# Patient Record
Sex: FEMALE | Race: WHITE | NOT HISPANIC OR LATINO
[De-identification: names, ages, dates, MRNs, and addresses within clinical notes are randomized per-mention and may not be internally consistent; named-entity substitution may affect disease eponyms.]

---

## 2017-02-06 ENCOUNTER — RECORDS - HEALTHEAST (OUTPATIENT)
Dept: ADMINISTRATIVE | Facility: OTHER | Age: 42
End: 2017-02-06

## 2017-03-17 ENCOUNTER — HOSPITAL ENCOUNTER (OUTPATIENT)
Dept: NEUROLOGY | Facility: CLINIC | Age: 42
Setting detail: THERAPIES SERIES
Discharge: STILL ADMITTED OR ADMINISTRATIVE | End: 2017-03-17
Attending: NURSE PRACTITIONER

## 2017-03-17 DIAGNOSIS — S06.9XAS HEADACHE AS LATE EFFECT OF BRAIN INJURY (H): ICD-10-CM

## 2017-03-17 DIAGNOSIS — R56.9 CONVULSIONS, UNSPECIFIED CONVULSION TYPE (H): ICD-10-CM

## 2017-03-17 DIAGNOSIS — F07.81 POST CONCUSSION SYNDROME: ICD-10-CM

## 2017-03-17 DIAGNOSIS — F06.4 ANXIETY DISORDER DUE TO MEDICAL CONDITION: ICD-10-CM

## 2017-03-17 DIAGNOSIS — G44.309 HEADACHE AS LATE EFFECT OF BRAIN INJURY (H): ICD-10-CM

## 2017-03-17 ASSESSMENT — MIFFLIN-ST. JEOR: SCORE: 1842.44

## 2017-03-21 ENCOUNTER — HOSPITAL ENCOUNTER (OUTPATIENT)
Dept: NEUROLOGY | Facility: CLINIC | Age: 42
Setting detail: THERAPIES SERIES
Discharge: STILL ADMITTED OR ADMINISTRATIVE | End: 2017-03-21
Attending: NURSE PRACTITIONER

## 2017-03-21 DIAGNOSIS — F34.1 PERSISTENT DEPRESSIVE DISORDER: ICD-10-CM

## 2017-03-21 DIAGNOSIS — F43.23 ADJUSTMENT DISORDER WITH MIXED ANXIETY AND DEPRESSED MOOD: ICD-10-CM

## 2017-03-21 DIAGNOSIS — F43.10 POSTTRAUMATIC STRESS DISORDER: ICD-10-CM

## 2017-03-23 ENCOUNTER — HOSPITAL ENCOUNTER (OUTPATIENT)
Dept: PHYSICAL THERAPY | Age: 42
Setting detail: THERAPIES SERIES
Discharge: STILL ADMITTED OR ADMINISTRATIVE | End: 2017-03-23
Attending: NURSE PRACTITIONER

## 2017-03-23 DIAGNOSIS — S06.9XAS HEADACHE AS LATE EFFECT OF BRAIN INJURY (H): ICD-10-CM

## 2017-03-23 DIAGNOSIS — F07.81 POST CONCUSSION SYNDROME: ICD-10-CM

## 2017-03-23 DIAGNOSIS — G44.309 HEADACHE AS LATE EFFECT OF BRAIN INJURY (H): ICD-10-CM

## 2017-03-27 ENCOUNTER — HOSPITAL ENCOUNTER (OUTPATIENT)
Dept: NEUROLOGY | Facility: CLINIC | Age: 42
Discharge: HOME | End: 2017-03-27
Attending: NURSE PRACTITIONER

## 2017-03-27 DIAGNOSIS — S06.9XAS HEADACHE AS LATE EFFECT OF BRAIN INJURY (H): ICD-10-CM

## 2017-03-27 DIAGNOSIS — F07.81 POST CONCUSSION SYNDROME: ICD-10-CM

## 2017-03-27 DIAGNOSIS — G44.309 HEADACHE AS LATE EFFECT OF BRAIN INJURY (H): ICD-10-CM

## 2017-03-28 ENCOUNTER — HOSPITAL ENCOUNTER (OUTPATIENT)
Dept: SPEECH THERAPY | Age: 42
Setting detail: THERAPIES SERIES
Discharge: STILL ADMITTED OR ADMINISTRATIVE | End: 2017-03-28
Attending: NURSE PRACTITIONER

## 2017-03-28 ENCOUNTER — HOSPITAL ENCOUNTER (OUTPATIENT)
Dept: OCCUPATIONAL THERAPY | Age: 42
Setting detail: THERAPIES SERIES
Discharge: STILL ADMITTED OR ADMINISTRATIVE | End: 2017-03-28
Attending: NURSE PRACTITIONER

## 2017-03-28 DIAGNOSIS — F07.81 POST CONCUSSION SYNDROME: ICD-10-CM

## 2017-03-28 DIAGNOSIS — H53.9 VISUAL DISTURBANCE: ICD-10-CM

## 2017-04-03 ENCOUNTER — HOSPITAL ENCOUNTER (OUTPATIENT)
Dept: NEUROLOGY | Facility: CLINIC | Age: 42
Setting detail: THERAPIES SERIES
Discharge: STILL ADMITTED OR ADMINISTRATIVE | End: 2017-04-03
Attending: NURSE PRACTITIONER

## 2017-04-03 ENCOUNTER — HOSPITAL ENCOUNTER (OUTPATIENT)
Dept: RADIOLOGY | Facility: CLINIC | Age: 42
Discharge: HOME | End: 2017-04-03
Attending: NURSE PRACTITIONER
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
## 2017-04-06 ENCOUNTER — HOSPITAL ENCOUNTER (OUTPATIENT)
Dept: SPEECH THERAPY | Age: 42
Setting detail: THERAPIES SERIES
Discharge: STILL ADMITTED OR ADMINISTRATIVE | End: 2017-04-06

## 2017-04-06 ENCOUNTER — HOSPITAL ENCOUNTER (OUTPATIENT)
Dept: OCCUPATIONAL THERAPY | Age: 42
Setting detail: THERAPIES SERIES
Discharge: STILL ADMITTED OR ADMINISTRATIVE | End: 2017-04-06
Attending: OCCUPATIONAL THERAPIST

## 2017-04-06 DIAGNOSIS — R41.841 COGNITIVE COMMUNICATION DEFICIT: ICD-10-CM

## 2017-04-06 DIAGNOSIS — F07.81 POST CONCUSSION SYNDROME: ICD-10-CM

## 2017-04-06 DIAGNOSIS — H53.9 VISUAL DISTURBANCE: ICD-10-CM

## 2017-04-07 ENCOUNTER — HOSPITAL ENCOUNTER (OUTPATIENT)
Dept: NEUROLOGY | Facility: CLINIC | Age: 42
Setting detail: THERAPIES SERIES
Discharge: STILL ADMITTED OR ADMINISTRATIVE | End: 2017-04-07

## 2017-04-07 DIAGNOSIS — F43.10 POSTTRAUMATIC STRESS DISORDER: ICD-10-CM

## 2017-04-07 DIAGNOSIS — F33.1 MAJOR DEPRESSIVE DISORDER, RECURRENT EPISODE, MODERATE (H): ICD-10-CM

## 2017-04-07 DIAGNOSIS — F43.23 ADJUSTMENT DISORDER WITH MIXED ANXIETY AND DEPRESSED MOOD: ICD-10-CM

## 2017-04-11 ENCOUNTER — HOSPITAL ENCOUNTER (OUTPATIENT)
Dept: NEUROLOGY | Facility: CLINIC | Age: 42
Setting detail: THERAPIES SERIES
Discharge: STILL ADMITTED OR ADMINISTRATIVE | End: 2017-04-11
Attending: NURSE PRACTITIONER

## 2017-04-11 DIAGNOSIS — F06.4 ANXIETY DISORDER DUE TO MEDICAL CONDITION: ICD-10-CM
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
## 2017-04-12 ENCOUNTER — HOSPITAL ENCOUNTER (OUTPATIENT)
Dept: NEUROLOGY | Facility: CLINIC | Age: 42
Discharge: HOME | End: 2017-04-12
Attending: NURSE PRACTITIONER

## 2017-04-13 ENCOUNTER — HOSPITAL ENCOUNTER (OUTPATIENT)
Dept: SPEECH THERAPY | Age: 42
Setting detail: THERAPIES SERIES
Discharge: STILL ADMITTED OR ADMINISTRATIVE | End: 2017-04-13

## 2017-04-13 ENCOUNTER — HOSPITAL ENCOUNTER (OUTPATIENT)
Dept: OCCUPATIONAL THERAPY | Age: 42
Setting detail: THERAPIES SERIES
Discharge: STILL ADMITTED OR ADMINISTRATIVE | End: 2017-04-13
Attending: OCCUPATIONAL THERAPIST

## 2017-04-13 DIAGNOSIS — H53.9 VISION DISTURBANCE: ICD-10-CM

## 2017-04-13 DIAGNOSIS — F07.81 POST CONCUSSION SYNDROME: ICD-10-CM

## 2017-04-13 DIAGNOSIS — R41.841 COGNITIVE COMMUNICATION DEFICIT: ICD-10-CM

## 2017-04-21 ENCOUNTER — HOSPITAL ENCOUNTER (OUTPATIENT)
Dept: NEUROLOGY | Facility: CLINIC | Age: 42
Setting detail: THERAPIES SERIES
Discharge: STILL ADMITTED OR ADMINISTRATIVE | End: 2017-04-21

## 2017-04-21 DIAGNOSIS — F33.1 MAJOR DEPRESSIVE DISORDER, RECURRENT EPISODE, MODERATE (H): ICD-10-CM

## 2017-04-21 DIAGNOSIS — F43.23 ADJUSTMENT DISORDER WITH MIXED ANXIETY AND DEPRESSED MOOD: ICD-10-CM

## 2017-04-21 DIAGNOSIS — F43.10 POSTTRAUMATIC STRESS DISORDER: ICD-10-CM

## 2017-04-27 ENCOUNTER — HOSPITAL ENCOUNTER (OUTPATIENT)
Dept: NEUROLOGY | Facility: CLINIC | Age: 42
Setting detail: THERAPIES SERIES
Discharge: STILL ADMITTED OR ADMINISTRATIVE | End: 2017-04-27
Attending: NURSE PRACTITIONER

## 2017-04-27 ENCOUNTER — HOSPITAL ENCOUNTER (OUTPATIENT)
Dept: OCCUPATIONAL THERAPY | Age: 42
Setting detail: THERAPIES SERIES
Discharge: STILL ADMITTED OR ADMINISTRATIVE | End: 2017-04-27
Attending: OCCUPATIONAL THERAPIST

## 2017-04-27 DIAGNOSIS — R11.2 NAUSEA & VOMITING: ICD-10-CM

## 2017-04-27 DIAGNOSIS — H53.9 VISUAL DISTURBANCE: ICD-10-CM

## 2017-04-27 DIAGNOSIS — F07.81 POST CONCUSSION SYNDROME: ICD-10-CM

## 2017-04-27 DIAGNOSIS — R11.2 NAUSEA AND VOMITING, INTRACTABILITY OF VOMITING NOT SPECIFIED, UNSPECIFIED VOMITING TYPE: ICD-10-CM

## 2017-04-27 DIAGNOSIS — G44.309 POST-CONCUSSION HEADACHE: ICD-10-CM

## 2017-05-09 ENCOUNTER — COMMUNICATION - HEALTHEAST (OUTPATIENT)
Dept: NEUROLOGY | Facility: CLINIC | Age: 42
End: 2017-05-09

## 2017-05-16 ENCOUNTER — HOSPITAL ENCOUNTER (OUTPATIENT)
Dept: NEUROLOGY | Facility: CLINIC | Age: 42
Setting detail: THERAPIES SERIES
Discharge: STILL ADMITTED OR ADMINISTRATIVE | End: 2017-05-16

## 2017-05-16 ENCOUNTER — HOSPITAL ENCOUNTER (OUTPATIENT)
Dept: NEUROLOGY | Facility: CLINIC | Age: 42
Setting detail: THERAPIES SERIES
Discharge: STILL ADMITTED OR ADMINISTRATIVE | End: 2017-05-16
Attending: NURSE PRACTITIONER

## 2017-05-16 DIAGNOSIS — S06.9X0S MILD NEUROCOGNITIVE DISORDER DUE TO TRAUMATIC BRAIN INJURY, WITHOUT LOSS OF CONSCIOUSNESS, SEQUELA: ICD-10-CM

## 2017-05-16 DIAGNOSIS — F43.10 POSTTRAUMATIC STRESS DISORDER: ICD-10-CM

## 2017-05-16 DIAGNOSIS — F33.1 MAJOR DEPRESSIVE DISORDER, RECURRENT EPISODE, MODERATE (H): ICD-10-CM

## 2017-05-16 DIAGNOSIS — F43.23 ADJUSTMENT DISORDER WITH MIXED ANXIETY AND DEPRESSED MOOD: ICD-10-CM

## 2017-05-16 DIAGNOSIS — G44.309 HEADACHE AS LATE EFFECT OF BRAIN INJURY (H): ICD-10-CM

## 2017-05-16 DIAGNOSIS — F07.81 POST CONCUSSION SYNDROME: ICD-10-CM

## 2017-05-16 DIAGNOSIS — S06.9XAS HEADACHE AS LATE EFFECT OF BRAIN INJURY (H): ICD-10-CM

## 2017-05-24 ENCOUNTER — HOSPITAL ENCOUNTER (OUTPATIENT)
Dept: NEUROLOGY | Facility: CLINIC | Age: 42
Setting detail: THERAPIES SERIES
Discharge: STILL ADMITTED OR ADMINISTRATIVE | End: 2017-05-24
Attending: NURSE PRACTITIONER

## 2017-05-24 DIAGNOSIS — G43.109 MIGRAINE AURA WITHOUT HEADACHE: ICD-10-CM

## 2017-05-24 DIAGNOSIS — G44.309 POST-CONCUSSION HEADACHE: ICD-10-CM

## 2017-05-25 ENCOUNTER — HOSPITAL ENCOUNTER (OUTPATIENT)
Dept: NEUROLOGY | Facility: CLINIC | Age: 42
Setting detail: THERAPIES SERIES
Discharge: STILL ADMITTED OR ADMINISTRATIVE | End: 2017-05-25
Attending: NURSE PRACTITIONER

## 2017-05-25 ENCOUNTER — HOSPITAL ENCOUNTER (OUTPATIENT)
Dept: OCCUPATIONAL THERAPY | Age: 42
Setting detail: THERAPIES SERIES
Discharge: STILL ADMITTED OR ADMINISTRATIVE | End: 2017-05-25
Attending: OCCUPATIONAL THERAPIST

## 2017-05-25 DIAGNOSIS — R41.840 POOR CONCENTRATION: ICD-10-CM

## 2017-05-25 DIAGNOSIS — F07.81 POST CONCUSSION SYNDROME: ICD-10-CM

## 2017-05-25 DIAGNOSIS — H53.9 VISUAL DISTURBANCE: ICD-10-CM

## 2017-06-08 ENCOUNTER — HOSPITAL ENCOUNTER (OUTPATIENT)
Dept: NEUROLOGY | Facility: CLINIC | Age: 42
Setting detail: THERAPIES SERIES
Discharge: STILL ADMITTED OR ADMINISTRATIVE | End: 2017-06-08
Attending: NURSE PRACTITIONER

## 2017-06-08 DIAGNOSIS — Z87.898 HISTORY OF SEIZURES: ICD-10-CM

## 2017-06-08 DIAGNOSIS — F07.81 POST CONCUSSION SYNDROME: ICD-10-CM

## 2017-06-08 DIAGNOSIS — G44.309 POST-CONCUSSION HEADACHE: ICD-10-CM

## 2017-06-08 DIAGNOSIS — F43.23 ADJUSTMENT DISORDER WITH MIXED ANXIETY AND DEPRESSED MOOD: ICD-10-CM

## 2017-06-21 ENCOUNTER — COMMUNICATION - HEALTHEAST (OUTPATIENT)
Dept: NEUROLOGY | Facility: CLINIC | Age: 42
End: 2017-06-21

## 2017-06-21 DIAGNOSIS — F06.4 ANXIETY DISORDER DUE TO MEDICAL CONDITION: ICD-10-CM

## 2017-06-23 ENCOUNTER — HOSPITAL ENCOUNTER (OUTPATIENT)
Dept: NEUROLOGY | Facility: CLINIC | Age: 42
Setting detail: THERAPIES SERIES
Discharge: STILL ADMITTED OR ADMINISTRATIVE | End: 2017-06-23
Attending: NURSE PRACTITIONER

## 2017-06-23 DIAGNOSIS — F43.23 ADJUSTMENT DISORDER WITH MIXED ANXIETY AND DEPRESSED MOOD: ICD-10-CM

## 2017-06-23 DIAGNOSIS — H57.13 EYE PAIN, BILATERAL: ICD-10-CM

## 2017-06-23 DIAGNOSIS — F07.81 POST CONCUSSION SYNDROME: ICD-10-CM

## 2017-06-23 DIAGNOSIS — G44.309 POST-CONCUSSION HEADACHE: ICD-10-CM

## 2017-07-11 ENCOUNTER — AMBULATORY - HEALTHEAST (OUTPATIENT)
Dept: NEUROLOGY | Facility: CLINIC | Age: 42
End: 2017-07-11

## 2017-07-12 ENCOUNTER — COMMUNICATION - HEALTHEAST (OUTPATIENT)
Dept: NEUROLOGY | Facility: CLINIC | Age: 42
End: 2017-07-12

## 2017-07-12 DIAGNOSIS — F07.81 POST CONCUSSION SYNDROME: ICD-10-CM

## 2017-07-12 DIAGNOSIS — F06.4 ANXIETY DISORDER DUE TO MEDICAL CONDITION: ICD-10-CM

## 2017-07-20 ENCOUNTER — HOSPITAL ENCOUNTER (OUTPATIENT)
Dept: OCCUPATIONAL THERAPY | Age: 42
Setting detail: THERAPIES SERIES
Discharge: STILL ADMITTED OR ADMINISTRATIVE | End: 2017-07-20
Attending: OCCUPATIONAL THERAPIST

## 2017-07-20 ENCOUNTER — COMMUNICATION - HEALTHEAST (OUTPATIENT)
Dept: NEUROLOGY | Facility: CLINIC | Age: 42
End: 2017-07-20

## 2017-07-20 ENCOUNTER — HOSPITAL ENCOUNTER (OUTPATIENT)
Dept: NEUROLOGY | Facility: CLINIC | Age: 42
Setting detail: THERAPIES SERIES
Discharge: STILL ADMITTED OR ADMINISTRATIVE | End: 2017-07-20
Attending: NURSE PRACTITIONER

## 2017-07-20 DIAGNOSIS — G47.9 SLEEP DISORDER: ICD-10-CM

## 2017-07-20 DIAGNOSIS — F07.81 POST CONCUSSION SYNDROME: ICD-10-CM

## 2017-07-20 DIAGNOSIS — F43.23 ADJUSTMENT DISORDER WITH MIXED ANXIETY AND DEPRESSED MOOD: ICD-10-CM

## 2017-07-20 DIAGNOSIS — Z87.898 HISTORY OF SEIZURES: ICD-10-CM

## 2017-07-20 DIAGNOSIS — F06.4 ANXIETY DISORDER DUE TO MEDICAL CONDITION: ICD-10-CM

## 2017-07-20 DIAGNOSIS — H53.9 VISUAL DISTURBANCE: ICD-10-CM

## 2017-08-09 ENCOUNTER — HOSPITAL ENCOUNTER (OUTPATIENT)
Dept: NEUROLOGY | Facility: CLINIC | Age: 42
Setting detail: THERAPIES SERIES
Discharge: STILL ADMITTED OR ADMINISTRATIVE | End: 2017-08-09
Attending: NURSE PRACTITIONER

## 2017-08-09 ENCOUNTER — HOSPITAL ENCOUNTER (OUTPATIENT)
Dept: OCCUPATIONAL THERAPY | Age: 42
Setting detail: THERAPIES SERIES
Discharge: STILL ADMITTED OR ADMINISTRATIVE | End: 2017-08-09
Attending: OCCUPATIONAL THERAPIST

## 2017-08-09 DIAGNOSIS — H53.9 VISUAL DISTURBANCE: ICD-10-CM

## 2017-08-09 DIAGNOSIS — F07.81 POST CONCUSSION SYNDROME: ICD-10-CM

## 2017-08-09 DIAGNOSIS — F43.23 ADJUSTMENT DISORDER WITH MIXED ANXIETY AND DEPRESSED MOOD: ICD-10-CM

## 2017-08-09 DIAGNOSIS — G44.309 POST-CONCUSSION HEADACHE: ICD-10-CM

## 2017-08-16 ENCOUNTER — HOSPITAL ENCOUNTER (OUTPATIENT)
Dept: OCCUPATIONAL THERAPY | Age: 42
Setting detail: THERAPIES SERIES
Discharge: STILL ADMITTED OR ADMINISTRATIVE | End: 2017-08-16
Attending: OCCUPATIONAL THERAPIST

## 2017-08-16 DIAGNOSIS — H53.9 VISUAL DISTURBANCE: ICD-10-CM

## 2017-08-16 DIAGNOSIS — F07.81 POST CONCUSSION SYNDROME: ICD-10-CM

## 2017-08-23 ENCOUNTER — HOSPITAL ENCOUNTER (OUTPATIENT)
Dept: NEUROLOGY | Facility: CLINIC | Age: 42
Setting detail: THERAPIES SERIES
Discharge: STILL ADMITTED OR ADMINISTRATIVE | End: 2017-08-23
Attending: NURSE PRACTITIONER

## 2017-08-23 DIAGNOSIS — G44.309 POST-CONCUSSION HEADACHE: ICD-10-CM

## 2017-08-23 DIAGNOSIS — F43.23 ADJUSTMENT DISORDER WITH MIXED ANXIETY AND DEPRESSED MOOD: ICD-10-CM

## 2017-08-23 DIAGNOSIS — F07.81 POST CONCUSSION SYNDROME: ICD-10-CM

## 2017-08-31 ENCOUNTER — HOSPITAL ENCOUNTER (OUTPATIENT)
Dept: PHYSICAL THERAPY | Age: 42
Setting detail: THERAPIES SERIES
Discharge: STILL ADMITTED OR ADMINISTRATIVE | End: 2017-08-31
Attending: NURSE PRACTITIONER

## 2017-08-31 DIAGNOSIS — G44.209 TENSION HEADACHE: ICD-10-CM

## 2017-08-31 DIAGNOSIS — F07.81 POST CONCUSSION SYNDROME: ICD-10-CM

## 2017-08-31 DIAGNOSIS — M54.2 CERVICAL PAIN: ICD-10-CM

## 2017-08-31 DIAGNOSIS — G44.309 POST-CONCUSSION HEADACHE: ICD-10-CM

## 2017-09-13 ENCOUNTER — HOSPITAL ENCOUNTER (OUTPATIENT)
Dept: NEUROLOGY | Facility: CLINIC | Age: 42
Setting detail: THERAPIES SERIES
Discharge: STILL ADMITTED OR ADMINISTRATIVE | End: 2017-09-13
Attending: NURSE PRACTITIONER

## 2017-09-13 DIAGNOSIS — F43.23 ADJUSTMENT DISORDER WITH MIXED ANXIETY AND DEPRESSED MOOD: ICD-10-CM

## 2017-09-13 DIAGNOSIS — M54.2 NECK PAIN: ICD-10-CM

## 2017-09-13 DIAGNOSIS — G44.309 POST-CONCUSSION HEADACHE: ICD-10-CM

## 2017-09-13 DIAGNOSIS — F07.81 POST CONCUSSION SYNDROME: ICD-10-CM

## 2017-09-28 ENCOUNTER — HOSPITAL ENCOUNTER (OUTPATIENT)
Dept: PHYSICAL THERAPY | Age: 42
Setting detail: THERAPIES SERIES
Discharge: STILL ADMITTED OR ADMINISTRATIVE | End: 2017-09-28
Attending: PHYSICAL THERAPIST

## 2017-09-28 DIAGNOSIS — M54.2 CERVICAL PAIN: ICD-10-CM

## 2017-09-28 DIAGNOSIS — G44.209 TENSION HEADACHE: ICD-10-CM

## 2017-10-03 ENCOUNTER — HOSPITAL ENCOUNTER (OUTPATIENT)
Dept: PHYSICAL THERAPY | Age: 42
Setting detail: THERAPIES SERIES
Discharge: STILL ADMITTED OR ADMINISTRATIVE | End: 2017-10-03
Attending: PHYSICAL THERAPIST

## 2017-10-03 DIAGNOSIS — G44.209 TENSION HEADACHE: ICD-10-CM

## 2017-10-03 DIAGNOSIS — M54.2 CERVICAL PAIN: ICD-10-CM

## 2017-10-11 ENCOUNTER — HOSPITAL ENCOUNTER (OUTPATIENT)
Dept: NEUROLOGY | Facility: CLINIC | Age: 42
Setting detail: THERAPIES SERIES
Discharge: STILL ADMITTED OR ADMINISTRATIVE | End: 2017-10-11
Attending: NURSE PRACTITIONER

## 2017-10-11 ENCOUNTER — HOSPITAL ENCOUNTER (OUTPATIENT)
Dept: PHYSICAL THERAPY | Age: 42
Setting detail: THERAPIES SERIES
Discharge: STILL ADMITTED OR ADMINISTRATIVE | End: 2017-10-11
Attending: PHYSICAL THERAPIST

## 2017-10-11 DIAGNOSIS — G44.209 TENSION HEADACHE: ICD-10-CM

## 2017-10-11 DIAGNOSIS — F07.81 POST CONCUSSION SYNDROME: ICD-10-CM

## 2017-10-11 DIAGNOSIS — M54.2 CERVICAL PAIN: ICD-10-CM

## 2017-10-11 DIAGNOSIS — F43.23 ADJUSTMENT DISORDER WITH MIXED ANXIETY AND DEPRESSED MOOD: ICD-10-CM

## 2017-10-19 ENCOUNTER — HOSPITAL ENCOUNTER (OUTPATIENT)
Dept: PHYSICAL THERAPY | Age: 42
Setting detail: THERAPIES SERIES
Discharge: STILL ADMITTED OR ADMINISTRATIVE | End: 2017-10-19
Attending: NURSE PRACTITIONER

## 2017-10-19 DIAGNOSIS — G44.209 TENSION HEADACHE: ICD-10-CM

## 2017-10-19 DIAGNOSIS — M54.2 CERVICAL PAIN: ICD-10-CM

## 2017-10-25 ENCOUNTER — COMMUNICATION - HEALTHEAST (OUTPATIENT)
Dept: NEUROLOGY | Facility: CLINIC | Age: 42
End: 2017-10-25

## 2017-10-25 DIAGNOSIS — F06.4 ANXIETY DISORDER DUE TO MEDICAL CONDITION: ICD-10-CM

## 2017-10-25 DIAGNOSIS — F07.81 POST CONCUSSION SYNDROME: ICD-10-CM

## 2017-10-26 ENCOUNTER — OFFICE VISIT (OUTPATIENT)
Dept: URGENT CARE | Facility: URGENT CARE | Age: 42
End: 2017-10-26
Payer: COMMERCIAL

## 2017-10-26 VITALS
HEIGHT: 71 IN | HEART RATE: 101 BPM | WEIGHT: 241 LBS | BODY MASS INDEX: 33.74 KG/M2 | OXYGEN SATURATION: 99 % | DIASTOLIC BLOOD PRESSURE: 89 MMHG | TEMPERATURE: 97.3 F | SYSTOLIC BLOOD PRESSURE: 136 MMHG

## 2017-10-26 DIAGNOSIS — H65.00 ACUTE SEROUS OTITIS MEDIA, RECURRENCE NOT SPECIFIED, UNSPECIFIED LATERALITY: ICD-10-CM

## 2017-10-26 DIAGNOSIS — J20.9 ACUTE BRONCHITIS WITH COEXISTING CONDITION REQUIRING PROPHYLACTIC TREATMENT: Primary | ICD-10-CM

## 2017-10-26 PROCEDURE — 99203 OFFICE O/P NEW LOW 30 MIN: CPT | Performed by: FAMILY MEDICINE

## 2017-10-26 RX ORDER — THYROID 15 MG/1
TABLET ORAL
COMMUNITY
Start: 2017-03-16

## 2017-10-26 RX ORDER — FOLIC ACID 1 MG/1
1 TABLET ORAL
COMMUNITY
Start: 2014-03-20

## 2017-10-26 RX ORDER — LAMOTRIGINE 25 MG/1
25 TABLET, EXTENDED RELEASE ORAL
COMMUNITY
Start: 2014-04-15

## 2017-10-26 RX ORDER — GABAPENTIN 300 MG/1
CAPSULE ORAL
COMMUNITY
Start: 2017-02-13

## 2017-10-26 RX ORDER — AZITHROMYCIN 250 MG/1
TABLET, FILM COATED ORAL
Qty: 6 TABLET | Refills: 0 | Status: SHIPPED | OUTPATIENT
Start: 2017-10-26

## 2017-10-26 RX ORDER — DESVENLAFAXINE 25 MG/1
50 TABLET, EXTENDED RELEASE ORAL
COMMUNITY
Start: 2017-10-25

## 2017-10-26 NOTE — NURSING NOTE
"Chief Complaint   Patient presents with     Urgent Care     Cough     c/o cough and nasal congestion for 4 days       Initial /89  Pulse 101  Temp 97.3  F (36.3  C) (Tympanic)  Ht 5' 11\" (1.803 m)  Wt 241 lb (109.3 kg)  LMP 10/19/2017  SpO2 99%  Breastfeeding? No  BMI 33.61 kg/m2 Estimated body mass index is 33.61 kg/(m^2) as calculated from the following:    Height as of this encounter: 5' 11\" (1.803 m).    Weight as of this encounter: 241 lb (109.3 kg).  Medication Reconciliation: complete   Paula Barajas MA    "

## 2017-10-26 NOTE — MR AVS SNAPSHOT
After Visit Summary   10/26/2017    Chanel Babin    MRN: [id number]           Patient Information     Date Of Birth          1975        Visit Information        Provider Department      10/26/2017 5:55 PM Jennifer Martin MD [facility]        Today's Diagnoses     Acute bronchitis with coexisting condition requiring prophylactic treatment    -  1    Acute serous otitis media, recurrence not specified, unspecified laterality          Care Instructions      Bronchitis, Antibiotic Treatment (Adult)    Bronchitis is an infection of the air passages (bronchial tubes) in your lungs. It often occurs when you have a cold. This illness is contagious during the first few days and is spread through the air by coughing and sneezing, or by direct contact (touching the sick person and then touching your own eyes, nose, or mouth).  Symptoms of bronchitis include cough with mucus (phlegm) and low-grade fever. Bronchitis usually lasts 7 to 14 days. Mild cases can be treated with simple home remedies. More severe infection is treated with an antibiotic.  Home care  Follow these guidelines when caring for yourself at home:    If your symptoms are severe, rest at home for the first 2 to 3 days. When you go back to your usual activities, don't let yourself get too tired.    Do not smoke. Also avoid being exposed to secondhand smoke.    You may use over-the-counter medicines to control fever or pain, unless another medicine was prescribed. (Note: If you have chronic liver or kidney disease or have ever had a stomach ulcer or gastrointestinal bleeding, talk with your healthcare provider before using these medicines. Also talk to your provider if you are taking medicine to prevent blood clots.) Aspirin should never be given to anyone younger than 18 years of age who is ill with a viral infection or fever. It may cause severe liver or brain damage.    Your appetite may be poor, so a  light diet is fine. Avoid dehydration by drinking 6 to 8 glasses of fluids per day (such as water, soft drinks, sports drinks, juices, tea, or soup). Extra fluids will help loosen secretions in the nose and lungs.    Over-the-counter cough, cold, and sore-throat medicines will not shorten the length of the illness, but they may be helpful to reduce symptoms. (Note: Do not use decongestants if you have high blood pressure.)    Finish all antibiotic medicine. Do this even if you are feeling better after only a few days.  Follow-up care  Follow up with your healthcare provider, or as advised. If you had an X-ray or ECG (electrocardiogram), a specialist will review it. You will be notified of any new findings that may affect your care.  Note: If you are age 65 or older, or if you have a chronic lung disease or condition that affects your immune system, or you smoke, talk to your healthcare provider about having pneumococcal vaccinations and a yearly influenza vaccination (flu shot).  When to seek medical advice  Call your healthcare provider right away if any of these occur:    Fever of 100.4 F (38 C) or higher    Coughing up increased amounts of colored sputum    Weakness, drowsiness, headache, facial pain, ear pain, or a stiff neck  Call 911, or get immediate medical care  Contact emergency services right away if any of these occur.    Coughing up blood    Worsening weakness, drowsiness, headache, or stiff neck    Trouble breathing, wheezing, or pain with breathing  Date Last Reviewed: 9/13/2015 2000-2017 The "[employer]". [address] [place] [address], [place] [address]. All rights reserved. This information is not intended as a substitute for professional medical care. Always follow your healthcare professional's instructions.                Follow-ups after your visit        Follow-up notes from your care team     Return if symptoms worsen or fail to improve.      Who to contact     If you have questions or  "need follow up information about today's clinic visit or your schedule please contact [facility] directly at [phone].  Normal or non-critical lab and imaging results will be communicated to you by MyChart, letter or phone within 4 business days after the clinic has received the results. If you do not hear from us within 7 days, please contact the clinic through [facility] or phone. If you have a critical or abnormal lab result, we will notify you by phone as soon as possible.  Submit refill requests through [employer] or call your pharmacy and they will forward the refill request to us. Please allow 3 business days for your refill to be completed.          Additional Information About Your Visit        [facility] Information     [employer] lets you send messages to your doctor, view your test results, renew your prescriptions, schedule appointments and more. To sign up, go to [email or web address] . Click on \"Log in\" on the left side of the screen, which will take you to the Welcome page. Then click on \"Sign up Now\" on the right side of the page.     You will be asked to enter the access code listed below, as well as some personal information. Please follow the directions to create your username and password.     Your access code is: [email or web address]  Expires: 2018  6:43 PM     Your access code will  in 90 days. If you need help or a new code, please call your [facility] clinic or [phone].        Care EveryWhere ID     This is your Care EveryWhere ID. This could be used by other organizations to access your [facility] medical records  [id number]        Your Vitals Were     Pulse Temperature Height Last Period Pulse Oximetry Breastfeeding?    101 97.3  F (36.3  C) (Tympanic) 5' 11\" (1.803 m) 10/19/2017 99% No    BMI (Body Mass Index)                   33.61 kg/m2            Blood Pressure from Last 3 Encounters:   10/26/17 136/89    Weight from Last 3 Encounters:   10/26/17 241 lb (109.3 " kg)              Today, you had the following     No orders found for display         Today's Medication Changes          These changes are accurate as of: 10/26/17  6:43 PM.  If you have any questions, ask your nurse or doctor.               Start taking these medicines.        Dose/Directions    azithromycin 250 MG tablet   Commonly known as:  ZITHROMAX   Used for:  Acute serous otitis media, recurrence not specified, unspecified laterality, Acute bronchitis with coexisting condition requiring prophylactic treatment   Started by:  Jennifer Martin MD        Two tablets first day, then one tablet daily for four days.   Quantity:  6 tablet   Refills:  0            Where to get your medicines      These medications were sent to [id number] - [place], [place] - [address], [place] [address]     Phone:  [phone]     azithromycin 250 MG tablet                Primary Care Provider Office Phone # Fax #    Boone Olson [phone]       [facility] [address] [address]        Equal Access to Services     [facility] AH: Hadii sivakumar ku hadasho Soomaali, waaxda luqadaha, qaybta kaalmada adeegyada, andressa marvin hayalexis blackwell . So [facility] [phone].    ATENCIÓN: Si habla español, tiene a hilliard disposición servicios gratuitos de asistencia lingüística. [facility] [phone].    We comply with applicable federal civil rights laws and [place] laws. We do not discriminate on the basis of race, color, national origin, age, disability, sex, sexual orientation, or gender identity.            Thank you!     Thank you for choosing [facility]  for your care. Our goal is always to provide you with excellent care. Hearing back from our patients is one way we can continue to improve our services. Please take a few minutes to complete the written survey that you may  receive in the mail after your visit with us. Thank you!             Your Updated Medication List - Protect others around you: Learn how to safely use, store and throw away your medicines at www.disposemymeds.org.          This list is accurate as of: 10/26/17  6:43 PM.  Always use your most recent med list.                   Brand Name Dispense Instructions for use Diagnosis    ARMOUR THYROID 15 MG Tabs tablet   Generic drug:  thyroid           azithromycin 250 MG tablet    ZITHROMAX    6 tablet    Two tablets first day, then one tablet daily for four days.    Acute serous otitis media, recurrence not specified, unspecified laterality, Acute bronchitis with coexisting condition requiring prophylactic treatment       cholecalciferol 5000 UNITS Tabs tablet    vitamin D3          desvenlafaxine succinate 25 MG 24 hr tablet    PRISTIQ     Take 50 mg by mouth        folic acid 1 MG tablet    FOLVITE     Take 1 mg by mouth        gabapentin 300 MG capsule    NEURONTIN          lamoTRIgine 25 MG 24 hr tablet    LaMICtal     Take 25 mg by mouth        S-Adenosylmethionine 400 MG Tabs      Take 400 mg by mouth

## 2017-10-26 NOTE — PROGRESS NOTES
SUBJECTIVE:   Chanel Babin is a 42 year old female who complains of nasal congestion, head pressure, cough that is described as painful and productive of green phlegm for the past 4 days.  Feels feverish. She denies a history of no other unusual symptoms. She denies a history of asthma. Patient does not smoke cigarettes.     OBJECTIVE:  Vitals as noted by Nurse/MA above.  Appearance: in no apparent distress.   ENT- bilateral TM gray but have fluid bubbles, neck without nodes, pharynx mildly erythematous without exudate, maxillary sinus tender and nasal mucosa congested.   Chest - chest clear to IPPA, no tachypnea, retractions or cyanosis and S1, S2 normal, no murmur, no gallop, rate regular.    ASSESSMENT:   Bronchitis with co-existing condition  Serous otitis    PLAN:  As per orders.   Symptomatic therapy suggested: push fluids, rest, gargle warm salt water, use vaporizer or mist needed  and use acetaminophen, cough suppressant of choice as needed. Call or return to clinic prn if these symptoms worsen or fail to improve as anticipated.  Jennifer Freeman MD

## 2017-10-26 NOTE — PATIENT INSTRUCTIONS
Bronchitis, Antibiotic Treatment (Adult)    Bronchitis is an infection of the air passages (bronchial tubes) in your lungs. It often occurs when you have a cold. This illness is contagious during the first few days and is spread through the air by coughing and sneezing, or by direct contact (touching the sick person and then touching your own eyes, nose, or mouth).  Symptoms of bronchitis include cough with mucus (phlegm) and low-grade fever. Bronchitis usually lasts 7 to 14 days. Mild cases can be treated with simple home remedies. More severe infection is treated with an antibiotic.  Home care  Follow these guidelines when caring for yourself at home:    If your symptoms are severe, rest at home for the first 2 to 3 days. When you go back to your usual activities, don't let yourself get too tired.    Do not smoke. Also avoid being exposed to secondhand smoke.    You may use over-the-counter medicines to control fever or pain, unless another medicine was prescribed. (Note: If you have chronic liver or kidney disease or have ever had a stomach ulcer or gastrointestinal bleeding, talk with your healthcare provider before using these medicines. Also talk to your provider if you are taking medicine to prevent blood clots.) Aspirin should never be given to anyone younger than 18 years of age who is ill with a viral infection or fever. It may cause severe liver or brain damage.    Your appetite may be poor, so a light diet is fine. Avoid dehydration by drinking 6 to 8 glasses of fluids per day (such as water, soft drinks, sports drinks, juices, tea, or soup). Extra fluids will help loosen secretions in the nose and lungs.    Over-the-counter cough, cold, and sore-throat medicines will not shorten the length of the illness, but they may be helpful to reduce symptoms. (Note: Do not use decongestants if you have high blood pressure.)    Finish all antibiotic medicine. Do this even if you are feeling better after only a  few days.  Follow-up care  Follow up with your healthcare provider, or as advised. If you had an X-ray or ECG (electrocardiogram), a specialist will review it. You will be notified of any new findings that may affect your care.  Note: If you are age 65 or older, or if you have a chronic lung disease or condition that affects your immune system, or you smoke, talk to your healthcare provider about having pneumococcal vaccinations and a yearly influenza vaccination (flu shot).  When to seek medical advice  Call your healthcare provider right away if any of these occur:    Fever of 100.4 F (38 C) or higher    Coughing up increased amounts of colored sputum    Weakness, drowsiness, headache, facial pain, ear pain, or a stiff neck  Call 911, or get immediate medical care  Contact emergency services right away if any of these occur.    Coughing up blood    Worsening weakness, drowsiness, headache, or stiff neck    Trouble breathing, wheezing, or pain with breathing  Date Last Reviewed: 9/13/2015 2000-2017 [employer]. [address] [place] [address], [place] [address]. All rights reserved. This information is not intended as a substitute for professional medical care. Always follow your healthcare professional's instructions.

## 2017-12-06 ENCOUNTER — HOSPITAL ENCOUNTER (OUTPATIENT)
Dept: NEUROLOGY | Facility: CLINIC | Age: 42
Setting detail: THERAPIES SERIES
Discharge: STILL ADMITTED OR ADMINISTRATIVE | End: 2017-12-06
Attending: NURSE PRACTITIONER

## 2017-12-06 DIAGNOSIS — F43.23 ADJUSTMENT DISORDER WITH MIXED ANXIETY AND DEPRESSED MOOD: ICD-10-CM

## 2017-12-06 DIAGNOSIS — G44.309 POST-CONCUSSION HEADACHE: ICD-10-CM

## 2017-12-06 DIAGNOSIS — F07.81 POST CONCUSSION SYNDROME: ICD-10-CM

## 2018-01-31 ENCOUNTER — HOSPITAL ENCOUNTER (OUTPATIENT)
Dept: NEUROLOGY | Facility: CLINIC | Age: 43
Setting detail: THERAPIES SERIES
Discharge: STILL ADMITTED OR ADMINISTRATIVE | End: 2018-01-31
Attending: NURSE PRACTITIONER

## 2018-01-31 DIAGNOSIS — F43.23 ADJUSTMENT DISORDER WITH MIXED ANXIETY AND DEPRESSED MOOD: ICD-10-CM

## 2018-01-31 DIAGNOSIS — Z87.898 HISTORY OF SEIZURES: ICD-10-CM

## 2018-01-31 DIAGNOSIS — F07.81 POST CONCUSSION SYNDROME: ICD-10-CM

## 2018-01-31 DIAGNOSIS — R53.83 FATIGUE, UNSPECIFIED TYPE: ICD-10-CM

## 2018-01-31 DIAGNOSIS — F06.4 ANXIETY DISORDER DUE TO MEDICAL CONDITION: ICD-10-CM

## 2018-01-31 DIAGNOSIS — G44.309 POST-CONCUSSION HEADACHE: ICD-10-CM

## 2018-03-14 ENCOUNTER — HOSPITAL ENCOUNTER (OUTPATIENT)
Dept: NEUROLOGY | Facility: CLINIC | Age: 43
Setting detail: THERAPIES SERIES
Discharge: STILL ADMITTED OR ADMINISTRATIVE | End: 2018-03-14
Attending: NURSE PRACTITIONER

## 2018-03-14 DIAGNOSIS — G44.309 POST-CONCUSSION HEADACHE: ICD-10-CM

## 2018-03-14 DIAGNOSIS — F07.81 POST CONCUSSION SYNDROME: ICD-10-CM

## 2018-03-14 DIAGNOSIS — F06.4 ANXIETY DISORDER DUE TO MEDICAL CONDITION: ICD-10-CM

## 2018-03-14 DIAGNOSIS — F43.23 ADJUSTMENT DISORDER WITH MIXED ANXIETY AND DEPRESSED MOOD: ICD-10-CM

## 2018-03-14 ASSESSMENT — MIFFLIN-ST. JEOR: SCORE: 1910.48

## 2018-03-19 ENCOUNTER — COMMUNICATION - HEALTHEAST (OUTPATIENT)
Dept: NEUROLOGY | Facility: CLINIC | Age: 43
End: 2018-03-19

## 2018-03-20 ENCOUNTER — COMMUNICATION - HEALTHEAST (OUTPATIENT)
Dept: NEUROLOGY | Facility: CLINIC | Age: 43
End: 2018-03-20

## 2018-12-21 ENCOUNTER — HOSPITAL PATHOLOGY (OUTPATIENT)
Dept: OTHER | Facility: CLINIC | Age: 43
End: 2018-12-21

## 2018-12-24 LAB — COPATH REPORT: NORMAL

## 2021-02-12 ENCOUNTER — COMMUNICATION - HEALTHEAST (OUTPATIENT)
Dept: TELEHEALTH | Facility: CLINIC | Age: 46
End: 2021-02-12

## 2021-02-12 ENCOUNTER — HOSPITAL ENCOUNTER (OUTPATIENT)
Dept: MRI IMAGING | Facility: CLINIC | Age: 46
Discharge: HOME | End: 2021-02-12

## 2021-02-12 DIAGNOSIS — R51.9 NEW ONSET OF HEADACHES: ICD-10-CM

## 2021-05-27 ENCOUNTER — RECORDS - HEALTHEAST (OUTPATIENT)
Dept: ADMINISTRATIVE | Facility: CLINIC | Age: 46
End: 2021-05-27

## 2021-05-29 ENCOUNTER — RECORDS - HEALTHEAST (OUTPATIENT)
Dept: ADMINISTRATIVE | Facility: CLINIC | Age: 46
End: 2021-05-29

## 2021-05-30 ENCOUNTER — RECORDS - HEALTHEAST (OUTPATIENT)
Dept: ADMINISTRATIVE | Facility: CLINIC | Age: 46
End: 2021-05-30

## 2021-05-30 VITALS — BODY MASS INDEX: 33.89 KG/M2 | WEIGHT: 243 LBS

## 2021-05-30 VITALS — WEIGHT: 240 LBS | BODY MASS INDEX: 33.47 KG/M2

## 2021-05-30 VITALS — HEIGHT: 71 IN | BODY MASS INDEX: 34.3 KG/M2 | WEIGHT: 245 LBS

## 2021-05-30 VITALS — WEIGHT: 250 LBS | BODY MASS INDEX: 34.87 KG/M2

## 2021-05-31 VITALS — WEIGHT: 250 LBS | BODY MASS INDEX: 34.87 KG/M2

## 2021-05-31 VITALS — BODY MASS INDEX: 33.75 KG/M2 | WEIGHT: 242 LBS

## 2021-05-31 VITALS — BODY MASS INDEX: 34.87 KG/M2 | WEIGHT: 250 LBS

## 2021-05-31 VITALS — WEIGHT: 246 LBS | BODY MASS INDEX: 34.31 KG/M2

## 2021-05-31 VITALS — BODY MASS INDEX: 34.31 KG/M2 | WEIGHT: 246 LBS

## 2021-05-31 VITALS — WEIGHT: 243 LBS | BODY MASS INDEX: 33.89 KG/M2

## 2021-05-31 VITALS — BODY MASS INDEX: 34.73 KG/M2 | WEIGHT: 249 LBS

## 2021-05-31 VITALS — BODY MASS INDEX: 33.89 KG/M2 | WEIGHT: 243 LBS

## 2021-06-01 VITALS — WEIGHT: 260 LBS | HEIGHT: 71 IN | BODY MASS INDEX: 36.4 KG/M2

## 2021-06-01 VITALS — BODY MASS INDEX: 35.84 KG/M2 | WEIGHT: 257 LBS

## 2021-06-09 NOTE — PROGRESS NOTES
"Speech Language/Pathology  Speech Therapy Outpatient Daily Visit Note    Chanel Babin  YOB: 1975     [id number]    Session 2 of 3    Subjective  Patient presents as alert, cooperative and motivated during this session.  An  was not applicable for this session.  Patient reports pain at 4-5 out of 10 (headache) pt states that this is not a \"bad\" headache.     Objective    Pt reported she had a change in medication since last visit and is sleeping better. At end of session pt was walked out to car with no falls.     Word finding:  -Pt was provided handout and verbal description of 9 strategies to use and identified which ones would be useful. Pt stated that she is excited to trial strategies at work and in social situations.     Cognitive fatigue:  -schedule breaks between activities and every hour at work. Handout provided.    Emotional regulation:  -Discussed taking breaks to close eyes and practice deep breathing.  -Pt will trial relaxation/meditation yoga at current exercise facility.     Assessment    Patient is motivated to participate in speech therapy and trial strategies provided. Patient continues to be a good candidate for 1:1 direct speech therapy to address goals.     Plan   Current goals remain appropriate    Time: 55 cognitive/linguistics minutes    Flavia Ty MA,CFY-SLP    "
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
## 2021-06-10 NOTE — PROGRESS NOTES
"Occupational Therapy    OCCUPATIONAL THERAPY TREATMENT SESSION  Rx Units: 4 sensory    Total Minutes: 55 minutes  Treatment #: 4/4  Insurance Carrier: Auto      Medical Diagnosis: Post Concussion    Treatment Dx: visual disturbance  Referring MD: ZANE Benedict  Onset date: 1/26/17    Start of Care: 3/28/17    Pain rating: 3/10  Location: headache/eye pain      SUBJECTIVE:  Pt doing well.  Reports HEP going well.  She is starting to notice progress in lessening ocular muscle fatigue.  Per pt, the marci colored glasses help with fatigue.  Pt reports \"some days are better than others, depends on activity level.\"  Pt notes she has headaches about twice a week (less than before) and reports these occur with increased activity.   She did work a full day yesterday, which has affected her eyes this morning.  She also notes that she takes computer breaks when her eyes feel strained.  She reports her eye pain/strain is not 100% gone, but that she is about 80% better in terms of her vision recovery.                            Vision Issues: difficulty bringing static items into focus close up (convergence), ocular muscle fatigue and light sensitivity.  Other information/data: as noted above        OBJECTIVE:    Discussed HEP.  Per pt, going well.  Also discussed rest breaks: therapist recommended using a timer and looking into distance/blinking eyes during breaks.   Retested convergence: 14cm, 15cm  Completed static convergence activity: placing beads on/taking them off different height needles w/ tweezer. Completed x5 sets. 1 min rest break between 3rd & 4th sets.   Completed dynamic convergence activity: zoom ball w/ therapist changing location, pt focusing on number or letter visible on ball as it comes closer/moves away. 1 min x5 sets, rested 1 min between each set.    Completed scanning activity: imazing trace on ipad. Completed level 5 without any difficulty. Completed level 6 without difficulty. Completed level 7 x3 " sets. Pt rested 1 min between each maze.  Provided pt w/ HEP handout for discharge.       Patients response to session tasks: Pt tolerated activities well. She did note an increase in eye strain, but was able to recover with rest breaks.       ASSESSMENT:  Pt tolerated session well. Needing less rest breaks between activities and able to do activities for longer periods of time. Discussed importance of work/rest balance with vision as well as HEP for d/c. Pt verbalized understanding for HEP for d/c as well as work/rest balance.  Pt has met all goals at this time and is appropriate for discharge.          Prognostic Indicators:  Rehabilitation potential: Excellent     Impairment: Impairment: Ocular muscle fatigue/pain, light sensitivity, decreased convergence, difficulty with focusing- all resolving      Progress towards goals:  goals met.        Functional Goals: to be met by 6/28/17      1. Pt to verb/demo understanding of vision HEP for ease of daily tasks.- met  2. Pt to verbalize understanding of concussion vision recovery process and education of vision/visual skills.- met  3. Pt to report decrease ocular muscle fatigue/pain after HEP completion.-met  4. Pt to verbalize understanding of environmental adaption's for vision during daily tasks/work for increased success.-met  5. Pt to demo increase of convergence by 5 cm for daily task success.-met       Plan of Care:  Paitent/Family Instruction: Treatment plan/rationale, home exercise program, expected functional outcome., vision exercises, vision HEP program, vision activities      Frequency / Duration: 1 x/week for 4 weeks Up to 4 visits    Signature: Vickey Magana, OTS 5/25/17  Student in direct line of sight of supervising therapist with supervising therapist directing treatment and plan of care.    Jaimie Lorenzo, OTRL/CBIS 5/25/17        Physician Recommendation:  1. I certify the need for these services furnished within this plan and while under  my care. I agree with the therapist's recommendation for plan of care.    2. If there is any recommendation for modification of therapy plan, please indicate below.

## 2021-06-10 NOTE — PROGRESS NOTES
Occupational Therapy    OCCUPATIONAL THERAPY TREATMENT SESSION  Rx Units: 4 sensory    Total Minutes: 55 minutes  Treatment #: 3/4  Insurance Carrier: Auto      Medical Diagnosis: Post Concussion    Treatment Dx: visual disturbance  Referring MD: ZANE Benedict  Onset date: 17    Start of Care: 3/28/17    Pain ratin/10  Location: headache around eyes       SUBJECTIVE:    Pt arrived on time and well rested.  Pt wearing a boot on L foot d/t fracture from fall, reports that she also has a splint for finger from fall. She is not wearing marci colored glasses this tx, and reports that she only wears them occasionally (for computer use). Pt reports that she feels 80% better and that last week she had 2 minor headaches, which is a lot less than usual. She requested to schedule out last visit 2 weeks from this date as to have more time. Per pt, HEP is going well and she is able to complete all exercises.                            Vision Issues: difficulty bringing static items into focus close up (convergence), ocular muscle fatigue and light sensitivity.  Other information/data: as noted above        OBJECTIVE:       Completed 15 minutes of [employer] Integrative Therapy (BITS): scanning, visual pursuit, and charts with 30 seconds-1 minute rest breaks between activities. She did wear her marci colored glasses during as this was done with the computer.   Completed dual sticks activity (convergence) 5 sets of 10 reps, resting 30 seconds between sets.   Completed convergence activity with putting dots in circles with pen coming from 2x R side 2x L side 1 min each w/ 1 min breaks between trials: 1st R:  in middle of circles, 1st L: 10/48, 2nd R: , 2nd L: 10/48    Patients response to session tasks: Pt required 3 minute rest break after BITS d/t eye strain and slight dizziness. TH noted pt pulling back during dual sticks activity as the focal pt was in closer to her face. Pt noted that this was d/t slight  pressure around her eyes. Pt did rest for 5 minutes at end of tx to alleviate dizziness.      ASSESSMENT:  Pt tolerated session well. Needing less rest breaks between activities and able to do activities for longer periods of time. No change to HEP this week, continue as noted at last visit.       Prognostic Indicators:  Rehabilitation potential: Excellent    Impairment: Impairment: Ocular muscle fatigue/pain, light sensitivity, decreased convergence, difficulty with focusing     Progress towards goals:  gradual progression towards goals. Cont goals.       Functional Goals: to be met by 6/28/17      1. Pt to verb/demo understanding of vision HEP for ease of daily tasks.- met  2. Pt to verbalize understanding of concussion vision recovery process and education of vision/visual skills.- met  3. Pt to report decrease ocular muscle fatigue/pain after HEP completion.-partially met  4. Pt to verbalize understanding of environmental adaption's for vision during daily tasks/work for increased success.-met  5. Pt to demo increase of convergence by 5 cm for daily task success.-cont      Plan of Care:  Paitent/Family Instruction: Treatment plan/rationale, home exercise program, expected functional outcome., vision exercises, vision HEP program, vision activities      Frequency / Duration: 1 x/week for 4 weeks Up to 4 visits  Signature: Vickey Magana, MORRIS 4/27/17    Student in direct line of sight of supervising therapist with supervising therapist directing treatment and plan of care.    Jaimie Lorenzo, OTRL/CBIS 4/27/17            Physician Recommendation:  1. I certify the need for these services furnished within this plan and while under my care. I agree with the therapist's recommendation for plan of care.    2. If there is any recommendation for modification of therapy plan, please indicate below.
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
## 2021-06-10 NOTE — PROGRESS NOTES
Speech Language/Pathology   Discharge Summary    Chanel Babin  YOB: 1975      [id number]   Referring Provider: Boone Olson MD      Date of Onset: 1/26/17  Start of Care: 3/28/17  Date of Last Visit: 4/13/17    Medical Diagnosis: mTBI  Treatment Diagnosis: cognition  The patient was seen for a total of 3 visits with no canceled/no show visits since the start of care.    Summary of Goals and Functional Progress    Long term goals: Pt will functionally compensate for language impairments and cognitive fatigue to participate in social and vocational activities.-met  Short term goals:   1) Pt will verbalize x3 new word-finding strategies. -met  2) Pt will verbalize x3 strategies for managing cognitive fatigue and emotional regulation. -met       Pt has good awareness of impairments and importance of compensation strategies. Patient is functionally incorporating strategies for word finding, emotional regulation, and cognitive fatigue in social and vocational activities.    Patient participated in education regarding treatment plan/rationale and home program and verbalized understanding.    Plan  Discontinue speech therapy  goals met      Flavia Ty MA,CFY-SLP      Physician Recommendation:  1. I certify the need for these services furnished within this jplan and while under my care.  I agree with the therapist's recommendtion for plan of care.  2. If there is any recommendation for modification of therapy plan, please indicate below.    Physician Signature: ____________________________________________
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
## 2021-06-10 NOTE — PROGRESS NOTES
How have you been doing since we last saw you? any concerns? New pt from Filomena Guold CNP.       Current Symptoms : No
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
## 2021-06-10 NOTE — PROGRESS NOTES
How have you been doing since we last saw you? any concerns?( new pt. Ask how concussion happen?) Pt still has headaches      Current Symptoms : Yes
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
## 2021-06-10 NOTE — PROGRESS NOTES
Speech Language/Pathology  Speech Therapy Outpatient Daily Visit Note    Chanel Babin  YOB: 1975     [id number]    Session 3 of 3    Subjective  Patient presents as alert, cooperative and motivated during this session.  An  was not applicable for this session.  Patient reports pain at 3 out of 10 (headache).    Objective    Word finding:  -Pt independently recalled 4 strategies and used at work and in social situations. Pt was able to identify strategies that were most effective.     Cognitive fatigue:  -schedule breaks between activities and every hour at work. -Pt stated she has taken breaks every hour.  -Pt continues to schedule meetings in the morning only.  -Pt is helping other employees to take on leadership roles and reduce her responsibilities to continue with reduced hours at work.       Emotional regulation:  -Discussed taking breaks to close eyes and practice deep breathing. -Pt stated that taking breaks with deep breathing has been helpful for calming emotional situations and refocusing on tasks.     Assessment    Patient has been a  motivated participant in speech therapy and trial strategies provided to improve word finding abilities and manage cognitive fatigue. Patient is self aware of impairments and has met ST goals.     Plan    Discontinue speech therapy due to goals met    Time: 35 cognitive/linguistics minutes    Flavia Ty MA,CFY-SLP
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
## 2021-06-10 NOTE — PROGRESS NOTES
Occupational Therapy    OCCUPATIONAL THERAPY TREATMENT SESSION      Rx Units: 4 sensory    Total Minutes: 56 minutes  Treatment #:   Insurance Carrier: [employer]      Medical Diagnosis: Post Concussion    Treatment Dx: visual disturbance  Referring MD: ZANE Benedict  Onset date: 17    Start of Care: 3/28/17    Pain ratin/10  Location: headache       SUBJECTIVE:    Pt doing well this morning. Per pt, has been able to complete vision HEP. She is completing them in the evening in a sitting position vs laying down. Last week she did need to lay down while completing them. All exercises are going well. Johnathan string exercise is the most challenging. She reports no falls after leaving the appointment last week. She did comment that later in the day she had a migraine that last a few days.                       Vision Issues: difficulty with convergence  Other information/data: has cut back on work over this past week.         OBJECTIVE:       Completed Abby ball (yellow); completed in standing, x3 sets, 1 minute intervals, calling and tapping - letters/numbers/letter to number seq. Mild dizziness at end of exercise. 1 minute break between each set. Completed convergence exercise in sitting; duel sticks tapping center x10 in multiple distant, x6 sets. Breaks between sets for 2 minutes. Increased challenge as center point closer to nose. Completed [employer] vision games; Clockwise: Silhouettes x4 sets, 2 minute breaks between. Per pt, needing extra time to bring sliding objects into focus, 100% acc. iMaze: level 7, x6 sets. Completed all exercise with marci colored sunglasses on.    Discussed concussion recovery again; need for time to recover/balance of rest-activity and awareness that recovery is possible. She verbalized understanding. Her one concern is her mood swing and mental fatigue. She is meeting with Psychology, I encouraged her to discuss this when they meet next.    Reviewed vision HEP; no changes made  this week. Will continue with HEP at same rate/frequency.      Patients response to session tasks: Mild dizziness, ocular muscle strain and headache slightly increased.      ASSESSMENT:  PT making gradual gains towards goals. Mental fatigue, difficulty bringing items into focus (convergence) and ocular muslce strain continue to limit her in her day to day and work tasks. She does appreciate a mild change in her visual tolerance of tasks. Cont OT/POC          Prognostic Indicators: Rehabilitation potential: Excellent      Impairment: Ocular muscle fatigue/pain, light sensitivity(resolving), decreased convergence, difficulty with focusing     Progress towards goals:  gradual progression towards goals. Cont goals.       Functional Goals: to be met by 6/28/17      1. Pt to verb/demo understanding of vision HEP for ease of daily tasks.- partially met  2. Pt to verbalize understanding of concussion vision recovery process and education of vision/visual skills.- met  3. Pt to report decrease ocular muscle fatigue/pain after HEP completion.-partially met  4. Pt to verbalize understanding of environmental adaption's for vision during daily tasks/work for increased success.-met  5. Pt to demo increase of convergence by 5 cm for daily task success.-cont      Plan of Care:  Paitent/Family Instruction: Treatment plan/rationale, home exercise program, expected functional outcome., vision exercises, vision HEP program, vision activities      Frequency / Duration: 1 x/week for 4 weeks Up to 4 visits       Signature: Jaimie Lorenzo, OTRL/SOHAM 4/13/17      Physician Recommendation:  1. I certify the need for these services furnished within this plan and while under my care. I agree with the therapist's recommendation for plan of care.    2. If there is any recommendation for modification of therapy plan, please indicate below.
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
## 2021-06-10 NOTE — PROGRESS NOTES
Occupational Therapy Concussion-Vision Discharge    Start of Care: 3/28/17  Date of Discharge: 5/25/17  Number (#) of sessions attended: 4/4  Insurance Carrier: [employer]    Refer to daily documentation flowsheet for equipment issued.       See initial evaulation for goals and POC  Goals: All goals met    1. Pt to verb/demo understanding of vision HEP for ease of daily tasks.- met  2. Pt to verbalize understanding of concussion vision recovery process and education of vision/visual skills.- met  3. Pt to report decrease ocular muscle fatigue/pain after HEP completion.-met  4. Pt to verbalize understanding of environmental adaption's for vision during daily tasks/work for increased success.-met  5. Pt to demo increase of convergence by 5 cm for daily task success.-met           D/C Summary:  Pt tolerates activity well.  We have discussed the importance of work/rest balance with vision as well as HEP for d/c.  Pt verbalized understanding for HEP for d/c as well as work/rest balance.  Pt has met all goals at this time and is appropriate for discharge.    Vickey Magana, MORRIS 5/25/17    Student in direct line of sight of supervising therapist with supervising therapist directing treatment and plan of care.    Jaimie Lorenzo, OTRL/SOHAM 5/25/17
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
## 2021-06-11 NOTE — PROGRESS NOTES
How have you been doing since we last saw you? any concerns?( new pt. Ask how concussion happen?) PT. Has had some headaches.Still having some eye pressure.Still at the high level of fatigue.      Current Symptoms : Yes small headache.

## 2021-06-11 NOTE — PROGRESS NOTES
Psychology Discharge Note    Patient has not followed up with this writer since most recent appointment in May 2017.  She was contacted regarding her interest in rescheduling and she indicated that she is doing fine and sees no current need for additional psychotherapy follow-up.  She will be considered discharged from this writer's psychological care at this time.      Provider Name: Patricia Muller PsyD, YVES  Date: 7/11/2017

## 2021-06-11 NOTE — PROGRESS NOTES
How have you been doing since we last saw you? any concerns?( new pt. Ask how concussion happen?) f/u appt, nothing new to report says pt.       Current Symptoms : Yes
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
## 2021-06-12 NOTE — PROGRESS NOTES
How have you been doing since we last saw you? any concerns?( new pt. Ask how concussion happen?) F/u apt. Pt states she's still getting HA's      Current Symptoms : Yes
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
## 2021-06-12 NOTE — PROGRESS NOTES
Occupational Therapy    OCCUPATIONAL THERAPY TREATMENT SESSION      Rx Units: 4 ADL    Total Minutes: 50 minutes  Treatment #:   Insurance Carrier: [employer]- [employer].     Medical Diagnosis: Post Concussion Syndrome                                                                                                           Treatment Dx: visual disturbance that affects daily/work tasks  Referring MD: ZANE Saldivar   Onset date: 17                 Start of Care: 17     Pain ratin/10  Location: ocular muscles/pressure       SUBJECTIVE:    Pt arrived on time and well rested. She was limping slightly on L foot. Per pt, had reconstructive surgery on foot a year ago and it has been bothering her most recently. She does not appreciate any new injury to her foot. She has started to complete the after surgery care she had recieved to help decrease the discomfort. HEP have been going well. Per pt, she has been sleeping much better and more sound. She is still only working part time and implementing the activity/rest/activity process. She has been able to get back to riding bike and per pt, more than last summer in regards to distance. She continues with using all the light sensitivity recommendations at home and work.                           Vision Issues: per pt feels like vision is improving and close to where it was after last round of OT sessions.   Other information/data: none        OBJECTIVE:       Reassess of convergence; w/ glasses 15 cm / 16cm, w/o glasses 11 cm / 11 cm    Sets game: Completed game for 10 minutes. Able to find several 'sets'. Very focused (attention) and self correcting.     Shifting focus in standing; completed x5 sets/ 1 minute interval/ 1 minute rest between,  metronome at 60 BPM. Mild strain towards end of 1 minute. Discussed ways to complete at home for continued work. She verbalized understanding.     Reviewed and gave handout for discontinuing vision HEP. Discussed in  future if feeling more visual strain could do exercise again. She verbalized understanding.     Patients response to session tasks: mild ocular muscle strain      ASSESSMENT:  Pt has done well with all activities/exercise in sessions and now at home. She is slowly getting back to all leisure activities and hopes to be able to keep moving up to full time work. She feels her visual tolerance is back to where she was after first OT sessions. Discussed continued practice of activity/rest/activity, adjusting work load and using coping techniques for light sensitivity. Goals being met.  No further OT at this time. Pt verbalized understanding of plan to d/c.       Progress towards goals: Continue STG/LTG- goals met     Prognostic Indicators:  Rehabilitation potential: Excellent      Impairment: Ocular muscle fatigue/pain, light sensitivity, decreased convergence - resolving      Functional Goals:  to be met by discharge.      1. Pt to verb/demo understanding of vision HEP for ease of daily tasks.-met  2. Pt to report decrease ocular muscle fatigue/pain after HEP completion.-met  3. Pt to verbalize understanding of environmental adaption's for vision during daily tasks/work for increased success.- met  4. Pt to demo increase/decrease of convergence by 10 cm for daily task success.- met      Plan of Care:  Paitent/Family Instruction: Treatment plan/rationale, home exercise program, expected functional outcome., vision HEP program,       Frequency / Duration: no further OT, goals met    Signature: Jaimie Lorenzo, OTRL/SOHAM 8/16/17      Physician Recommendation:  1. I certify the need for these services furnished within this plan and while under my care. I agree with the therapist's recommendation for plan of care.    2. If there is any recommendation for modification of therapy plan, please indicate below.
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
## 2021-06-12 NOTE — PROGRESS NOTES
How have you been doing since we last saw you? any concerns?( new pt. Ask how concussion happen?) pt said she is still having a lot of headaches and eye vision.       Current Symptoms : Yes

## 2021-06-12 NOTE — PROGRESS NOTES
Occupational Therapy Concussion-Vision Discharge      Start of Care: 7/20/17  Date of Discharge: 8/16/17  Number (#) of sessions attended: 2/2  Insurance Carrier: [employer]- [employer]    Refer to daily documentation flowsheet for equipment issued.     See initial evaulation for goals and POC    Goals: met    1. Pt to verb/demo understanding of vision HEP for ease of daily tasks.-met  2. Pt to report decrease ocular muscle fatigue/pain after HEP completion.-met  3. Pt to verbalize understanding of environmental adaption's for vision during daily tasks/work for increased success.- met  4. Pt to demo increase/decrease of convergence by 10 cm for daily task success.- met        D/C Summary: Pt has done well with all activities/exercise tolerance. She continues to use the marci tinted clip on glasses, light sensitivity recommendations and resting throughout her day. She has made good progress and fells she is doing as well as she can expect at this time. She has the tools and understanding to continue success at home. No further 1:1 out patient OT at this time.     Jaimie Lorenzo, OTRL/SOHAM 8/16/17

## 2021-06-12 NOTE — PROGRESS NOTES
How have you been doing since we last saw you? any concerns?( new pt. Ask how concussion happen?) PT . Says she is struggling with headaches and fatigue      Current Symptoms : Yes little headache

## 2021-06-12 NOTE — PROGRESS NOTES
Occupational Therapy    OCCUPATIONAL THERAPY TREATMENT SESSION      Rx Units: 4 ADLs    Total Minutes: 52 minutes  Treatment #:   Insurance Carrier:[employer]- [employer].    Medical Diagnosis: Post Concussion Syndrome                                                                                                           Treatment Dx: visual disturbance that affects daily/work tasks  Referring MD: ZANE Saldivar   Onset date: 17                 Start of Care: 17     Pain ratin/10  Location: eye strain w/low grade headache       SUBJECTIVE:    Pt arrived on time, in good spirits and appropriately dressed for the weather. Per pt, vision exercises have been going pretty good, except for the Johnathan String. She notes increased strain/discomfort when completing them. She is doing all exercises together. We discussed breaking up the exercises and doing the Johnathan string separately. She verbalized understanding and felt it was a good plan. She continues to have the light in her home and work environment adjusted and wears her marci tinted sunglass clip ons's for tasks.     Vision Issues: mild light sensistivity, ocular muscle fatigue/strain and difficulty shifting focus up close (convergence)  Other information/data: no other issues mentioned      OBJECTIVE:      Reviewed vision HEP:  Was completing Finger to Finger exercise incorrectly. Therapist demonstrated and instructed pt on accurate follow through of exercise. She was able to demonstrate exercise indep and accurately. She verbalized understanding.        Convergence activities/exercixe:  *Paper dot fill in- completed x2 sets in sitting position. No rest needed between sets.     *tracing inside 1/2 inch [place]- Tolerated well. No rest periods needed. Completed with readers w/marci tinted on. Per pt, less strain felt when readers w/ marci tint used during activity.    *Straw/stick activity- completed in sitting with 30 second rest between each set. x10  "reps monocular/biocular, x5 sets.     * Abby Ball- x5 sets/ 1 minute interval completed in standing.     Ocular muscle fatigue exercise/actvitity:    *Dynavision completed in Mode A/ 1 minute intervals.   Practice; 49 hits / 1.22 sec to locate.   1. 57 lights hit / 1.07 sec to locate  2. 60 lights hit / .99 sec to locate  3. 59 lights hit / 1.01 sec to locate  4. 60 lights hit / 1.00 sec to locate    Dynavision safe  norms indicate at 52+ lights hits. Less than 45 hits unsafe .      Patients response to session tasks: mild ocular muscle strain during exercise/actvitiy, mild tiredness      ASSESSMENT:  Mrs Babin continues to making gradual gains towards goals. She has done well with adjusting her environment for the light sensitivity and continues to need to find the \"right\" balance of activity/rest. Her ocular muscle fatigue is slightly improved however is dependent on her daily tasks. Cont OT/POC      Next Session treatment ideas: review vision HEP, give handout and review HEP d/c, ocular muscle fatigue/strain activities- Ipad/word search/mazes/Dynavision, re-assess convergence, convergence activity; wall darts, Abby Ball, paper dot fill in.      Progress towards goals: Continue STG/LTG, making progress towards goals.     Prognostic Indicators:  Rehabilitation potential: Excellent     Impairment: Ocular muscle fatigue/pain, light sensitivity, decreased convergence     Functional Goals:  to be met by discharge.     1. Pt to verb/demo understanding of vision HEP for ease of daily tasks.-cont  2. Pt to report decrease ocular muscle fatigue/pain after HEP completion.-cont  3. Pt to verbalize understanding of environmental adaption's for vision during daily tasks/work for increased success.-partially met  4. Pt to demo increase/decrease of convergence by 10 cm for daily task success.-cont     Plan of Care:  Paitent/Family Instruction: Treatment plan/rationale, home exercise program, expected " functional outcome., vision exercises, vision HEP program, vision activities     Frequency / Duration: 1 x/week for  2 weeks up to 2 visits    Signature: Jaimie Lorenzo OTRL/SOHAM 8/9/17      Physician Recommendation:  1. I certify the need for these services furnished within this plan and while under my care. I agree with the therapist's recommendation for plan of care.    2. If there is any recommendation for modification of therapy plan, please indicate below.
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
## 2021-06-13 NOTE — PROGRESS NOTES
Physical Therapy  Physical Therapy Daily Outpatient Documentation        Rx Units: 2TE, 1US  Total OP Minutes: 45    Treatment #: 4/6    Pain: 4/10; 2/10   Location: cervical; HA  Intervention: see below    Subjective: Pt reports she really feels like the exercises are helping.  Pt thinks a combination of the chiropractic care and PT are addressing her pain.  The pt notes feeling less tense and more mobility in the neck since starting PT.    Therapeutic Exercise  Total Minutes: 35    NuStep x 10 min, level #4, avg MET:  2.0, SPM:  64    -Supine chin tucks with isometric push down x 10 reps with 8 sec hold.  Cues to isolate chin tuck first prior to push down.  -Bridging x 15 reps with ex ball and no UE support.  Improved control and stability observed this date.  -Sidelying shoulder ER with scap setting, 3# wt x 10 reps ea side.  Cues to set scapula before UE raise with ea rep.  Good form after cueing.  -Prone scap retraction x 15 reps with cues to pull scapula down and in.    -Standing scap retraction with orange thera band x 10 reps; pt demo'd increased upper trap activation and had greater difficulty isolating her M/L traps with elbows flexed.  PT instructed to keep arms at side with elbows extended.          Ultrasound  Total Minutes: 10    -Pt seated with resting head and upper trunk on an elevated mat.  Pt is wearing a gown, opening in the back, to expose her R upper trap and R scapular region  -8 min total, duty cycle:  continuous, frequency:  2 mHz; power 0 to 5.0    Gait Training  Total Minutes:       HEP:     -Supine chin tucks   -Seated R levator scap stretch   -Seated R upper trap stretch  -Seated scap retraction with W technique  -Bridging  -Sidelying scap retraction with shoulder ER    -Standing scap retraction with thera band  -Supine chin tucks with isometric push down  -Quadruped alt UE/LE      Assessment/Plan for week of 10/9/2017-10/13/2017:  The pt responded well to PT and to ultrasound this date.   The pt has a single remaining appointment scheduled.  In general, she has a large HEP and has been compliant with her exercises.  Plan to reassess next Rx session with likely DC at that time.      Additional Notes:

## 2021-06-13 NOTE — PROGRESS NOTES
"Physical Therapy  Physical Therapy Daily Outpatient Documentation        Rx Units: 4TE    Total OP Minutes: 55    Treatment #: 3/6    Pain: 4/10; 2/10   Location: cervical; HA  Intervention: see below    Subjective: Pt had a massage last night and she felt like she was more intense in the cervical region but she notes feeling like she has greater mobility now.  Pt also notes having a good day yesterday with minimal symptoms, \"I feel like we're moving in the right direction.\"    Therapeutic Exercise  Total Minutes: 55    NuStep x 10 min, level #3, avg MET:  2.0, SPM:  64    -Supine chin tucks x 5 reps with 15 sec hold.  Pt demos good form without additional cueing from PT.  -**Supine chin tucks with isometric push down x 5 reps with 5 sec hold.  Cues to isolate chin tuck first prior to push down.  -Bridging 2 x 15 reps with 2-3 sec holds and no UE support.  Good form.  -Bridging x 15 reps with ex ball and no UE support.  Pt is unsteady and tends to deviate to the left  -**Quadruped alt UE/LE x 16 reps (8 ea side).  Cues to maintain chin tuck and keep pelvis level.  Pt felt lightheaded with this exercise.  -Sidelying shoulder ER with scap setting, 2# wt x 15 reps ea side.  Cues to set scapula before UE raise with ea rep.  Good form after cueing.  -Standing scap retraction with orange thera band x 10 reps; pt demo'd increased upper trap activation and had greater difficulty isolating her M/L traps with elbows flexed.  PT instructed to keep arms at side with elbows extended.          Neuro Re-Ed/Balance  Total Minutes:       Gait Training  Total Minutes:       HEP:     -Supine chin tucks   -Seated R levator scap stretch   -Seated R upper trap stretch  -Seated scap retraction with W technique  -Bridging  -Sidelying scap retraction with shoulder ER    -Standing scap retraction with thera band  -Supine chin tucks with isometric push down  -Quadruped alt UE/LE      Assessment/Plan for week of 10/2/2017-10/6/2017:  The pt " tolerated progression of exercises well and the **'ed exercises above were added to the pt's HEP this date.  The pt inquired about the use of ultra sound as she had a positive experience in the past.  The pt acknowledges that she has chronic pain that is similar to a fibromyalgia presentation.  She thinks the ultrasound could be helpful to help reduce the elevated pain at this time.  PT will trial ultrasound next Rx session given her previous good experience.  PT reiterated that ongoing management of pain does not include ultrasound but rather exercise and a healthy lifestyle, to which the pt agreed.    Additional Notes:

## 2021-06-13 NOTE — PROGRESS NOTES
"Physical Therapy  Physical Therapy Daily Outpatient Documentation        Rx Units: 3TE    Total OP Minutes: 40    Treatment #: 2/6    Pain: 3-4/10 \"significant pain\"; 1-2/10 and \"growing\"  Location: cervical; HA  Intervention: see below    Subjective: Pt feels like her neck pain is flared up this date.  The pt sees a chiropractor and massage therapist 1x/wk    Therapeutic Exercise  Total Minutes: 40    NuStep x 10 min, level #3, avg MET:  1.7, SPM:  55    -Seated R upper trap trigger point release x 5 reps with ipsilateral cervical rotation; pt c/o increased nausea and muscular recoil with minimal pressure  -Supine chin tucks x 10 reps with 8 sec hold.  Pt demos good form without additional cueing from PT.  -**Bridging x 15 reps with 2-3 sec holds and no UE support.  Good form.  -**Sidelying shoulder ER with scap setting, no weight x 15 reps ea side.  Cues to set scapula before UE raise.  Good form after cueing.  -**Standing scap retraction with peach thera band x 15 reps with cues to keep elbows flexed and pull scapula down and in.    -Reviewed seated upper trap and levator scap stretches; PT reminded pt to stay within pain free range.  Pt able to demo stretch with submax intensity.        Neuro Re-Ed/Balance  Total Minutes:       Gait Training  Total Minutes:       HEP:     -Supine chin tucks   -Seated R levator scap stretch   -Seated R upper trap stretch  -Seated scap retraction with W technique  -Bridging  -Sidelying scap retraction with shoulder ER    -Standing scap retraction with thera band      Assessment/Plan for week of 9/25/2017-9/29/2017:  Pt returns to PT for initial subsequent visit since her eval on 8/31/2017.  The pt's HEP was progressed this date with the **'ed exercises above.  The pt demonstrates increased autonomic response to her trigger point release and general stretching to her upper traps, R>L.  Pt should benefit from a low grade aerobic and strengthening approach to reduce her symptoms.  " The pt does see a chiropractor and massage therapist 1x/wk each, thus manual therapy interventions with PT will be kept to bare minimum, it at all.  The PT encouraged the pt to allow her massage therapist to address her upper trap trigger points as able.  Continue to progress strengthening program next Rx session; plan to add quadruped exercises and cervical kinesthetic training.    Additional Notes:

## 2021-06-13 NOTE — PROGRESS NOTES
How have you been doing since we last saw you? any concerns?( new pt. Ask how concussion happen?) Pt would like to discuss instructions on the desvennlafaxine      Current Symptoms : Yes

## 2021-06-13 NOTE — PROGRESS NOTES
Physical Therapy    Physical Therapy Daily Outpatient Documentation   And DISCHARGE SUMMARY         Rx Units: 2TE, 1US  Total OP Minutes: 45                                    Treatment #: 5/6     Pain: 3/10; 0/10   Location: cervical; HA  Intervention: see below     Subjective: Pt reports that she has had more recent flare ups but overall feels like she's gotten better and that a combination of exercises with massage and chiropractic care has made a big difference for overall management.     Therapeutic Exercise  Total Minutes: 35     NuStep x 10 min, level #5, avg MET:  2.6, SPM:  77     -Supine chin tucks with isometric push down x 3 reps with 15 sec hold.  Good form.  -Bridging x 15 reps with ex ball and no UE support.  Mild instability observed but no LOB.  Cues to engage glutes.  -Sidelying shoulder ER with scap setting, 4# wt x 15 reps ea side.  Good form with no additional cues.  -Prone scap retraction x 10 reps with cues to pull scapula down and in.    -Standing scap retraction with orange thera band 2 x 10 reps; Much improved form this date and engagement of M/L traps without additional cueing from PT    NDI:  42%         Ultrasound  Total Minutes: 10     -Pt seated with resting head and upper trunk on an elevated mat.  Pt is wearing a gown, opening in the back, to expose her R upper trap and R scapular region  -8 min total, duty cycle:  continuous, frequency:  2 mHz; power 0 to 6.0     Gait Training  Total Minutes:         HEP:      -Supine chin tucks   -Seated R levator scap stretch   -Seated R upper trap stretch  -Seated scap retraction with W technique  -Bridging  -Sidelying scap retraction with shoulder ER    -Standing scap retraction with thera band  -Supine chin tucks with isometric push down  -Quadruped alt UE/LE        DISCHARGE SUMMARY:  Pt completed 5 PT sessions with emphasis placed on a cervical strengthening program to help the pt manage her chronic neck pain and HAs related to her concussion  injury.  The pt demonstrated overall good improvement with her pain levels and feels more independent with symptom management as she is competent and compliant with her HEP. Some of the goals listed in the POC could not be reassessed due to this author forgetting to measure the DNF endurance and not initially remembering to issue the NDI at the onset of PT.  Pt does report feeling likes she's better and stronger since starting PT.  The pt met the remaining of the goals outlined in the POC.  At this time, the pt is appropriate for DC from skilled 1:1 PT; the pt agrees with this plan.

## 2021-06-14 NOTE — PROGRESS NOTES
(New pt. How did the concussion happen and a date?) How have you been doing since we last saw you? any concerns? Pt would like to f/u on her headaches, neck pain, trouble with focusing, and work.      Current Symptoms : No
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
## 2021-06-16 PROBLEM — Z87.898 HISTORY OF SEIZURES: Status: ACTIVE | Noted: 2017-06-16

## 2021-06-16 PROBLEM — F07.81 POST CONCUSSION SYNDROME: Status: ACTIVE | Noted: 2017-06-16

## 2021-06-16 PROBLEM — F43.23 ADJUSTMENT DISORDER WITH MIXED ANXIETY AND DEPRESSED MOOD: Status: ACTIVE | Noted: 2017-06-16

## 2021-06-16 NOTE — PROGRESS NOTES
(New pt. How did the concussion happen and a date?) How have you been doing since we last saw you? any concerns? No concerns,       Current Symptoms : Yes; neck pain improved           .[facility]
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
## 2021-07-03 NOTE — ADDENDUM NOTE
Addendum Note by Jaimie Lorenzo OT at 4/13/2017  8:58 AM     Author: Jaimie Lorenzo OT Service: -- Author Type: Occupational Therapist    Filed: 4/13/2017  8:58 AM Date of Service: 4/13/2017  8:58 AM Status: Signed    : Jaimie Lorenzo OT (Occupational Therapist)    Encounter addended by: Jaimie Lorenzo OT on: 4/13/2017  8:58 AM<BR>     Actions taken: Sign clinical note

## 2021-07-03 NOTE — ADDENDUM NOTE
Addendum Note by Jaimie Lorenzo OT at 3/28/2017  4:05 PM     Author: Jaimie Lorenzo OT Service: -- Author Type: Occupational Therapist    Filed: 3/28/2017  4:05 PM Date of Service: 3/28/2017  4:05 PM Status: Signed    : Jaimie Lorenzo OT (Occupational Therapist)    Encounter addended by: Jaimie Lorenzo OT on: 3/28/2017  4:05 PM<BR>     Actions taken: Sign clinical note

## 2021-07-03 NOTE — ADDENDUM NOTE
Addendum Note by Jaimie Lorenzo OT at 5/25/2017  3:47 PM     Author: Jaimie Lorenzo OT Service: -- Author Type: Occupational Therapist    Filed: 5/25/2017  3:47 PM Date of Service: 5/25/2017  3:47 PM Status: Signed    : Jaimie Lorenzo OT (Occupational Therapist)    Encounter addended by: Jaimie Lorenzo OT on: 5/25/2017  3:47 PM<BR>     Actions taken: Charge Capture section accepted

## 2021-07-03 NOTE — ADDENDUM NOTE
Addendum Note by Jaimie Lorenzo OT at 7/20/2017 11:59 PM     Author: Jaimie Lorenzo OT Service: -- Author Type: Occupational Therapist    Filed: 7/31/2017  6:16 PM Date of Service: 7/20/2017 11:59 PM Status: Signed    : Jaimie Lorenzo OT (Occupational Therapist)    Encounter addended by: Jaimie Lorenzo OT on: 7/31/2017  6:16 PM<BR>     Actions taken: Sign clinical note

## 2021-07-03 NOTE — ADDENDUM NOTE
Addendum Note by Jaimie Lorenzo OT at 8/9/2017 11:05 PM     Author: Jaimie Lorenzo OT Service: -- Author Type: Occupational Therapist    Filed: 8/9/2017 11:05 PM Date of Service: 8/9/2017 11:05 PM Status: Signed    : Jaimie Lorenzo OT (Occupational Therapist)    Encounter addended by: Jaimie Lorenzo OT on: 8/9/2017 11:05 PM<BR>     Actions taken: Sign clinical note
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
